# Patient Record
Sex: FEMALE | Race: WHITE | NOT HISPANIC OR LATINO | Employment: UNEMPLOYED | URBAN - METROPOLITAN AREA
[De-identification: names, ages, dates, MRNs, and addresses within clinical notes are randomized per-mention and may not be internally consistent; named-entity substitution may affect disease eponyms.]

---

## 2017-01-26 ENCOUNTER — OFFICE VISIT - HEALTHEAST (OUTPATIENT)
Dept: INTERNAL MEDICINE | Facility: CLINIC | Age: 62
End: 2017-01-26

## 2017-01-26 DIAGNOSIS — Z00.00 ANNUAL PHYSICAL EXAM: ICD-10-CM

## 2017-01-26 DIAGNOSIS — Z78.0 POST-MENOPAUSAL: ICD-10-CM

## 2017-01-26 LAB
CHOLEST SERPL-MCNC: 220 MG/DL
FASTING STATUS PATIENT QL REPORTED: YES
HDLC SERPL-MCNC: 97 MG/DL
LDLC SERPL CALC-MCNC: 109 MG/DL
TRIGL SERPL-MCNC: 69 MG/DL

## 2017-01-26 ASSESSMENT — MIFFLIN-ST. JEOR: SCORE: 1214.23

## 2017-02-01 LAB
HPV INTERPRETATION - HISTORICAL: NORMAL
HPV INTERPRETER - HISTORICAL: NORMAL

## 2017-02-02 ENCOUNTER — RECORDS - HEALTHEAST (OUTPATIENT)
Dept: ADMINISTRATIVE | Facility: OTHER | Age: 62
End: 2017-02-02

## 2017-02-02 ENCOUNTER — RECORDS - HEALTHEAST (OUTPATIENT)
Dept: BONE DENSITY | Facility: CLINIC | Age: 62
End: 2017-02-02

## 2017-02-02 ENCOUNTER — COMMUNICATION - HEALTHEAST (OUTPATIENT)
Dept: INTERNAL MEDICINE | Facility: CLINIC | Age: 62
End: 2017-02-02

## 2017-02-02 DIAGNOSIS — Z78.0 ASYMPTOMATIC MENOPAUSAL STATE: ICD-10-CM

## 2017-02-02 LAB
BKR LAB AP ABNORMAL BLEEDING: NO
BKR LAB AP BIRTH CONTROL/HORMONES: NORMAL
BKR LAB AP CERVICAL APPEARANCE: NORMAL
BKR LAB AP GYN ADEQUACY: NORMAL
BKR LAB AP GYN INTERPRETATION: NORMAL
BKR LAB AP HPV REFLEX: NORMAL
BKR LAB AP LMP: NORMAL
BKR LAB AP PATIENT STATUS: NORMAL
BKR LAB AP PREVIOUS ABNORMAL: NORMAL
BKR LAB AP PREVIOUS NORMAL: 2013
HIGH RISK?: NO
PATH REPORT.COMMENTS IMP SPEC: NORMAL
RESULT FLAG (HE HISTORICAL CONVERSION): NORMAL

## 2017-02-09 ENCOUNTER — COMMUNICATION - HEALTHEAST (OUTPATIENT)
Dept: INTERNAL MEDICINE | Facility: CLINIC | Age: 62
End: 2017-02-09

## 2018-01-31 ENCOUNTER — HOSPITAL ENCOUNTER (OUTPATIENT)
Dept: MAMMOGRAPHY | Facility: HOSPITAL | Age: 63
Discharge: HOME | End: 2018-01-31

## 2018-01-31 ENCOUNTER — OFFICE VISIT - HEALTHEAST (OUTPATIENT)
Dept: INTERNAL MEDICINE | Facility: CLINIC | Age: 63
End: 2018-01-31

## 2018-01-31 DIAGNOSIS — Z12.31 VISIT FOR SCREENING MAMMOGRAM: ICD-10-CM

## 2018-01-31 DIAGNOSIS — M81.0 OSTEOPOROSIS: ICD-10-CM

## 2018-01-31 DIAGNOSIS — Z00.00 ANNUAL PHYSICAL EXAM: ICD-10-CM

## 2018-01-31 DIAGNOSIS — E78.5 HLD (HYPERLIPIDEMIA): ICD-10-CM

## 2018-01-31 ASSESSMENT — MIFFLIN-ST. JEOR: SCORE: 1178.85

## 2018-02-02 ENCOUNTER — HOSPITAL ENCOUNTER (OUTPATIENT)
Dept: MAMMOGRAPHY | Facility: HOSPITAL | Age: 63
Discharge: HOME | End: 2018-02-02

## 2018-02-02 DIAGNOSIS — N64.89 BREAST ASYMMETRY: ICD-10-CM

## 2018-05-08 ENCOUNTER — COMMUNICATION - HEALTHEAST (OUTPATIENT)
Dept: INTERNAL MEDICINE | Facility: CLINIC | Age: 63
End: 2018-05-08

## 2019-02-19 ENCOUNTER — OFFICE VISIT - HEALTHEAST (OUTPATIENT)
Dept: INTERNAL MEDICINE | Facility: CLINIC | Age: 64
End: 2019-02-19

## 2019-02-19 DIAGNOSIS — E78.2 MIXED HYPERLIPIDEMIA: ICD-10-CM

## 2019-02-19 DIAGNOSIS — M81.0 AGE-RELATED OSTEOPOROSIS WITHOUT CURRENT PATHOLOGICAL FRACTURE: ICD-10-CM

## 2019-02-19 DIAGNOSIS — Z12.11 SCREEN FOR COLON CANCER: ICD-10-CM

## 2019-02-19 DIAGNOSIS — Z78.0 POST-MENOPAUSAL: ICD-10-CM

## 2019-02-19 DIAGNOSIS — Z00.00 WELCOME TO MEDICARE PREVENTIVE VISIT: ICD-10-CM

## 2019-02-19 LAB
ANION GAP SERPL CALCULATED.3IONS-SCNC: 6 MMOL/L (ref 5–18)
ATRIAL RATE - MUSE: 69 BPM
BUN SERPL-MCNC: 14 MG/DL (ref 8–22)
CALCIUM SERPL-MCNC: 9.7 MG/DL (ref 8.5–10.5)
CHLORIDE BLD-SCNC: 104 MMOL/L (ref 98–107)
CHOLEST SERPL-MCNC: 217 MG/DL
CO2 SERPL-SCNC: 29 MMOL/L (ref 22–31)
CREAT SERPL-MCNC: 0.67 MG/DL (ref 0.6–1.1)
DIASTOLIC BLOOD PRESSURE - MUSE: NORMAL MMHG
FASTING STATUS PATIENT QL REPORTED: YES
GFR SERPL CREATININE-BSD FRML MDRD: >60 ML/MIN/1.73M2
GLUCOSE BLD-MCNC: 85 MG/DL (ref 70–125)
HDLC SERPL-MCNC: 99 MG/DL
INTERPRETATION ECG - MUSE: NORMAL
LDLC SERPL CALC-MCNC: 107 MG/DL
P AXIS - MUSE: 58 DEGREES
POTASSIUM BLD-SCNC: 4.1 MMOL/L (ref 3.5–5)
PR INTERVAL - MUSE: 130 MS
QRS DURATION - MUSE: 82 MS
QT - MUSE: 402 MS
QTC - MUSE: 430 MS
R AXIS - MUSE: 52 DEGREES
SODIUM SERPL-SCNC: 139 MMOL/L (ref 136–145)
SYSTOLIC BLOOD PRESSURE - MUSE: NORMAL MMHG
T AXIS - MUSE: 31 DEGREES
TRIGL SERPL-MCNC: 54 MG/DL
VENTRICULAR RATE- MUSE: 69 BPM

## 2019-02-19 ASSESSMENT — MIFFLIN-ST. JEOR: SCORE: 1224.21

## 2019-02-20 ENCOUNTER — COMMUNICATION - HEALTHEAST (OUTPATIENT)
Dept: INTERNAL MEDICINE | Facility: CLINIC | Age: 64
End: 2019-02-20

## 2019-03-07 ENCOUNTER — COMMUNICATION - HEALTHEAST (OUTPATIENT)
Dept: INTERNAL MEDICINE | Facility: CLINIC | Age: 64
End: 2019-03-07

## 2019-03-11 ASSESSMENT — MIFFLIN-ST. JEOR
SCORE: 1224.21
SCORE: 1224.21

## 2019-03-12 ENCOUNTER — ANESTHESIA - HEALTHEAST (OUTPATIENT)
Dept: SURGERY | Facility: AMBULATORY SURGERY CENTER | Age: 64
End: 2019-03-12

## 2019-03-13 ENCOUNTER — HOSPITAL ENCOUNTER (OUTPATIENT)
Dept: SURGERY | Facility: AMBULATORY SURGERY CENTER | Age: 64
Discharge: HOME | End: 2019-03-13
Attending: SURGERY | Admitting: SURGERY

## 2019-03-13 ENCOUNTER — TRANSFERRED RECORDS (OUTPATIENT)
Dept: HEALTH INFORMATION MANAGEMENT | Facility: CLINIC | Age: 64
End: 2019-03-13

## 2019-03-13 ENCOUNTER — SURGERY - HEALTHEAST (OUTPATIENT)
Dept: SURGERY | Facility: AMBULATORY SURGERY CENTER | Age: 64
End: 2019-03-13

## 2019-03-13 ASSESSMENT — MIFFLIN-ST. JEOR
SCORE: 1224.21
SCORE: 1224.21

## 2020-01-03 ENCOUNTER — COMMUNICATION - HEALTHEAST (OUTPATIENT)
Dept: INTERNAL MEDICINE | Facility: CLINIC | Age: 65
End: 2020-01-03

## 2020-01-03 DIAGNOSIS — R92.30 DENSE BREAST TISSUE: ICD-10-CM

## 2020-01-03 DIAGNOSIS — Z12.31 ENCOUNTER FOR SCREENING MAMMOGRAM FOR BREAST CANCER: ICD-10-CM

## 2020-02-12 ENCOUNTER — COMMUNICATION - HEALTHEAST (OUTPATIENT)
Dept: INTERNAL MEDICINE | Facility: CLINIC | Age: 65
End: 2020-02-12

## 2020-02-13 ENCOUNTER — AMBULATORY - HEALTHEAST (OUTPATIENT)
Dept: INTERNAL MEDICINE | Facility: CLINIC | Age: 65
End: 2020-02-13

## 2020-02-13 DIAGNOSIS — Z12.31 VISIT FOR SCREENING MAMMOGRAM: ICD-10-CM

## 2020-02-19 ENCOUNTER — HOSPITAL ENCOUNTER (OUTPATIENT)
Dept: MAMMOGRAPHY | Facility: CLINIC | Age: 65
Discharge: HOME | End: 2020-02-19

## 2020-02-19 DIAGNOSIS — Z12.31 VISIT FOR SCREENING MAMMOGRAM: ICD-10-CM

## 2020-03-11 ENCOUNTER — OFFICE VISIT - HEALTHEAST (OUTPATIENT)
Dept: INTERNAL MEDICINE | Facility: CLINIC | Age: 65
End: 2020-03-11

## 2020-03-11 DIAGNOSIS — E78.2 MIXED HYPERLIPIDEMIA: ICD-10-CM

## 2020-03-11 DIAGNOSIS — M81.0 AGE-RELATED OSTEOPOROSIS WITHOUT CURRENT PATHOLOGICAL FRACTURE: ICD-10-CM

## 2020-03-11 DIAGNOSIS — Z00.00 ENCOUNTER FOR MEDICARE ANNUAL WELLNESS EXAM: ICD-10-CM

## 2020-03-11 ASSESSMENT — MIFFLIN-ST. JEOR: SCORE: 1192.46

## 2021-01-05 ENCOUNTER — COMMUNICATION - HEALTHEAST (OUTPATIENT)
Dept: ADMINISTRATIVE | Facility: CLINIC | Age: 66
End: 2021-01-05

## 2021-01-05 DIAGNOSIS — Z78.0 POST-MENOPAUSAL: ICD-10-CM

## 2021-03-16 ENCOUNTER — OFFICE VISIT - HEALTHEAST (OUTPATIENT)
Dept: INTERNAL MEDICINE | Facility: CLINIC | Age: 66
End: 2021-03-16

## 2021-03-16 DIAGNOSIS — R25.1 TREMOR: ICD-10-CM

## 2021-03-16 DIAGNOSIS — E55.9 VITAMIN D DEFICIENCY: ICD-10-CM

## 2021-03-16 DIAGNOSIS — Z13.1 SCREENING FOR DIABETES MELLITUS: ICD-10-CM

## 2021-03-16 DIAGNOSIS — E78.2 MIXED HYPERLIPIDEMIA: ICD-10-CM

## 2021-03-16 DIAGNOSIS — Z00.00 ENCOUNTER FOR ANNUAL WELLNESS EXAM IN MEDICARE PATIENT: ICD-10-CM

## 2021-03-16 DIAGNOSIS — Z11.59 ENCOUNTER FOR HEPATITIS C SCREENING TEST FOR LOW RISK PATIENT: ICD-10-CM

## 2021-03-16 LAB
CHOLEST SERPL-MCNC: 221 MG/DL
FASTING STATUS PATIENT QL REPORTED: YES
FASTING STATUS PATIENT QL REPORTED: YES
GLUCOSE BLD-MCNC: 93 MG/DL (ref 70–125)
HCV AB SERPL QL IA: NEGATIVE
HDLC SERPL-MCNC: 102 MG/DL
LDLC SERPL CALC-MCNC: 106 MG/DL
TRIGL SERPL-MCNC: 65 MG/DL

## 2021-03-16 ASSESSMENT — MIFFLIN-ST. JEOR: SCORE: 1246.89

## 2021-03-17 ENCOUNTER — AMBULATORY - HEALTHEAST (OUTPATIENT)
Dept: INTERNAL MEDICINE | Facility: CLINIC | Age: 66
End: 2021-03-17

## 2021-03-17 DIAGNOSIS — E55.9 VITAMIN D DEFICIENCY: ICD-10-CM

## 2021-03-17 LAB
25(OH)D3 SERPL-MCNC: 29.5 NG/ML (ref 30–80)
25(OH)D3 SERPL-MCNC: 29.5 NG/ML (ref 30–80)

## 2021-03-23 ENCOUNTER — AMBULATORY - HEALTHEAST (OUTPATIENT)
Dept: INTERNAL MEDICINE | Facility: CLINIC | Age: 66
End: 2021-03-23

## 2021-03-23 ENCOUNTER — COMMUNICATION - HEALTHEAST (OUTPATIENT)
Dept: INTERNAL MEDICINE | Facility: CLINIC | Age: 66
End: 2021-03-23

## 2021-03-23 DIAGNOSIS — E55.9 VITAMIN D DEFICIENCY: ICD-10-CM

## 2021-05-26 ENCOUNTER — RECORDS - HEALTHEAST (OUTPATIENT)
Dept: ADMINISTRATIVE | Facility: CLINIC | Age: 66
End: 2021-05-26

## 2021-05-27 ENCOUNTER — RECORDS - HEALTHEAST (OUTPATIENT)
Dept: ADMINISTRATIVE | Facility: CLINIC | Age: 66
End: 2021-05-27

## 2021-05-28 ENCOUNTER — RECORDS - HEALTHEAST (OUTPATIENT)
Dept: ADMINISTRATIVE | Facility: CLINIC | Age: 66
End: 2021-05-28

## 2021-05-30 VITALS — HEIGHT: 64 IN | BODY MASS INDEX: 26.43 KG/M2 | WEIGHT: 154.8 LBS

## 2021-05-31 VITALS — HEIGHT: 64 IN | WEIGHT: 147 LBS | BODY MASS INDEX: 25.1 KG/M2

## 2021-06-02 VITALS — WEIGHT: 157 LBS | BODY MASS INDEX: 26.8 KG/M2 | HEIGHT: 64 IN

## 2021-06-02 VITALS
HEIGHT: 64 IN | HEIGHT: 64 IN | BODY MASS INDEX: 26.8 KG/M2 | BODY MASS INDEX: 26.8 KG/M2 | WEIGHT: 157 LBS | WEIGHT: 157 LBS

## 2021-06-04 VITALS
SYSTOLIC BLOOD PRESSURE: 130 MMHG | HEIGHT: 64 IN | WEIGHT: 150 LBS | DIASTOLIC BLOOD PRESSURE: 74 MMHG | BODY MASS INDEX: 25.61 KG/M2 | HEART RATE: 80 BPM

## 2021-06-04 NOTE — TELEPHONE ENCOUNTER
Who is calling:  Patient    Reason for Call:  Caller states patient is scheduled for Mammogram appointment in February caller  Requesting provider to send 3 D mammogram  order to patient insurance , because she had problem last time when she went for mammogram . Please reach out patient   for any questions.  Date of last appointment with primary care: 02/19/19  Okay to leave a detailed message: No

## 2021-06-05 VITALS
WEIGHT: 162 LBS | HEART RATE: 70 BPM | HEIGHT: 64 IN | DIASTOLIC BLOOD PRESSURE: 62 MMHG | SYSTOLIC BLOOD PRESSURE: 138 MMHG | BODY MASS INDEX: 27.66 KG/M2

## 2021-06-06 NOTE — TELEPHONE ENCOUNTER
"Spoke with patients  regarding appts that were scheduled today. He mentioned that Kalpana is having a mammogram done in the next week, they are requesting if they could have a letter to send to their insurance that she could have the 3D mammogram instead of the x-ray as he stated \"everytime she has one, they find something and have her do the 3D one anyway\"        Please advise.  "

## 2021-06-06 NOTE — TELEPHONE ENCOUNTER
Let patient know that I ordered the mammogram as a 3D mammogram. I do not recommend a letter to the insurance, typically the order with a notation that she has dense breast tissue is sufficient. Note that she could have an additional change for this type of 3D mammogram if insurance does not fully cover the cost, they could contact the insurance to find out if it is covered fully or not.

## 2021-06-06 NOTE — PROGRESS NOTES
Assessment and Plan:     1. Encounter for Medicare annual wellness exam    2. Mixed hyperlipidemia  Lipid panel last year not in a range where medication recommended. Continue regular exercise and healthy diet     3. Age-related osteoporosis without current pathological fracture  On drug holiday currently. Repeat DXA in 2021. Consider Reclast d/t intolerance of Fosamax         The patient's current medical problems were reviewed.    The following high BMI interventions were performed this visit: encouragement to exercise     The following health maintenance schedule was reviewed with the patient and provided in printed form in the after visit summary:   Health Maintenance   Topic Date Due     HEPATITIS C SCREENING  1955     MEDICARE ANNUAL WELLNESS VISIT  02/19/2020     INFLUENZA VACCINE RULE BASED (1) 06/30/2020 (Originally 8/1/2019)     DXA SCAN  03/06/2021     FALL RISK ASSESSMENT  03/11/2021     TD 18+ HE  12/29/2021     MAMMOGRAM  02/19/2022     ADVANCE CARE PLANNING  01/31/2023     LIPID  02/19/2024     COLORECTAL CANCER SCREENING  03/13/2029     PNEUMOCOCCAL IMMUNIZATION 65+ LOW/MEDIUM RISK  Completed     ZOSTER VACCINES  Completed        Subjective:   Chief Complaint: Charissa Lou is an 65 y.o. female here for an Annual Wellness visit.     HPI:  Charissa Lou is an 65 y.o. female here for an Annual Wellness visit.     She is on a drug holiday from treatment of osteoporosis.     Review of Systems:  Please see above.  The rest of the review of systems are negative for all systems.    Patient Care Team:  Misti Tidwell FNP as PCP - General (Nurse Practitioner)  Misti Tidwell FNP as Assigned PCP     Patient Active Problem List   Diagnosis     Osteoporosis     Osteoarthritis     HLD (hyperlipidemia), 10-year ASCVD risk 3.2% 2/2017     Encounter for screening colonoscopy     Past Medical History:   Diagnosis Date     Arthritis       Past Surgical History:   Procedure Laterality Date      COLONOSCOPY N/A 3/13/2019    Procedure: COLONOSCOPY;  Surgeon: Mu Paiz MD;  Location: [facility] OR;  Service: General      Family History   Problem Relation Age of Onset     Osteoporosis Mother      Diabetes Father      Breast cancer Neg Hx       Social History     Socioeconomic History     Marital status:      Spouse name: Not on file     Number of children: 1     Years of education: Not on file     Highest education level: Not on file   Occupational History     Not on file   Social Needs     Financial resource strain: Not on file     Food insecurity     Worry: Not on file     Inability: Not on file     Transportation needs     Medical: Not on file     Non-medical: Not on file   Tobacco Use     Smoking status: Never Smoker     Smokeless tobacco: Never Used   Substance and Sexual Activity     Alcohol use: Yes     Alcohol/week: 0.0 standard drinks     Drug use: No     Sexual activity: Not on file   Lifestyle     Physical activity     Days per week: Not on file     Minutes per session: Not on file     Stress: Not on file   Relationships     Social connections     Talks on phone: Not on file     Gets together: Not on file     Attends Taoism service: Not on file     Active member of club or organization: Not on file     Attends meetings of clubs or organizations: Not on file     Relationship status: Not on file     Intimate partner violence     Fear of current or ex partner: Not on file     Emotionally abused: Not on file     Physically abused: Not on file     Forced sexual activity: Not on file   Other Topics Concern     Not on file   Social History Narrative     Not on file      Current Outpatient Medications   Medication Sig Dispense Refill     CALCIUM CARBONATE/VITAMIN D3 (CALCIUM 500 + D ORAL) Take 1 tablet by mouth 2 times a day at 6:00 am and 4:00 pm.       cholecalciferol, vitamin D3, 1,000 unit tablet Take 1,000 Units by mouth daily.       glucosamine-chondroitin 500-400 mg cap Take 2  "capsules by mouth daily.       PSYLLIUM SEED, WITH SUGAR, (METAMUCIL ORAL) Take by mouth.       No current facility-administered medications for this visit.       Objective:   Vital Signs:   Visit Vitals  /74   Pulse 80   Ht 5' 3.5\" (1.613 m)   Wt 150 lb (68 kg)   BMI 26.15 kg/m         VisionScreening:  No exam data present     PHYSICAL EXAM  Gen: Well developed, well nourished, no acute distress.  HEENT: normocephalic/atraumatic, PERRL/EOMI, TMs: Gray, normal light reflex, no nasal discharge.  Oral mucosa: no erythema/exudate  Neck: No LAD/masses/thyromegaly/bruits  Lungs: clear bilaterally  Heart: regular rate and rhythm, no murmurs/gallops/rubs  Abdomen: Normal bowel sounds, soft, non-tender, non-distended  Lymphatics: no supraclavicular/cervical LAD. No edema.  Neuro: A&O x 3.  Psych: Behavior appropriate, engaging.  Thought processes congruent, non-tangential.  Musculoskeletal: no gross deformities.  Skin: no rashes or lesions.      Assessment Results 3/11/2020   Activities of Daily Living No help needed   Instrumental Activities of Daily Living No help needed   Mini Cog Total Score 5   Some recent data might be hidden     A Mini-Cog score of 0-2 suggests the possibility of dementia, score of 3-5 suggests no dementia    Identified Health Risks:     Information regarding advance directives (living [place]), including where she can download the appropriate form, was provided to the patient via the AVS.       "

## 2021-06-06 NOTE — TELEPHONE ENCOUNTER
Left generic message for patient to call back. Please relay information when she calls back, thanks.

## 2021-06-08 NOTE — PROGRESS NOTES
Assessment/Plan:     1. Annual physical exam  - Lipid Profile  - Glucose; Future  - Gynecologic Cytology (PAP Smear)  - Reviewed the importance of monitoring for changes in breast appearance such as nipple inversion, changes in breast tissue texture, non-healing wounds, or nipple discharge   - The following high BMI interventions were performed this visit: encouragement to exercise   - Colonoscopy due for repeat, she declines     2. Post-menopausal  - DXA Bone Density Scan; Future. Previous rx for fosamax, stopped 2 years ago after 5 years of treatment           Subjective:     Charissa Lou is a 62 y.o. female who presents for an annual exam. She does not have any new concerns today    Has a past medical history of osteoporosis and was on Fosamax for approximately 5 years.  The treatment was stopped 2 years ago and she has not had a follow-up bone density since .    The patient reports that there is not domestic violence in her life.     Healthy Habits:   Regular Exercise: Yes, exercise bike   Sunscreen Use: Yes  Healthy Diet: Yes  Dental Visits Regularly: Yes  Sexually active: No  Mammogram: 2016  Colonoscopy: Yes, . Due for follow up colonoscopy but she declines   Prevention of Osteoporosis: Yes, vitamin D and calcium supplements  Last Dexa: Yes,     Immunization History   Administered Date(s) Administered     DT (pediatric) 2002     Tdap 2008, 2011     ZOSTER 2015     Immunization status: UTD and documented     Gynecologic History  No LMP recorded. Patient is postmenopausal.  Contraception: post menopausal status  Last Pap: . Results were: normal  HPV testing: not done   Last mammogram: . Results were: normal     OB History    Para Term  AB SAB TAB Ectopic Multiple Living   1 1 1             # Outcome Date GA Lbr Tariq/2nd Weight Sex Delivery Anes PTL Lv   1 Term                   Current Outpatient Prescriptions   Medication Sig Dispense Refill      CALCIUM CARBONATE/VITAMIN D3 (CALCIUM 500 + D ORAL) Take 1 tablet by mouth 2 times a day at 6:00 am and 4:00 pm.       cholecalciferol, vitamin D3, 1,000 unit tablet Take 1,000 Units by mouth daily.       glucosamine-chondroitin 500-400 mg cap Take 2 capsules by mouth daily.       PSYLLIUM SEED, WITH SUGAR, (METAMUCIL ORAL) Take by mouth.       No current facility-administered medications for this visit.      History reviewed. No pertinent past medical history.  History reviewed. No pertinent past surgical history.  Review of patient's allergies indicates no known allergies.  Family History   Problem Relation Age of Onset     Osteoporosis Mother      No Medical Problems Father      No Medical Problems Sister      No Medical Problems Daughter      No Medical Problems Maternal Grandmother      No Medical Problems Maternal Grandfather      No Medical Problems Paternal Grandmother      No Medical Problems Paternal Grandfather      No Medical Problems Maternal Aunt      No Medical Problems Paternal Aunt      BRCA 1/2 Neg Hx      Breast cancer Neg Hx      Cancer Neg Hx      Colon cancer Neg Hx      Endometrial cancer Neg Hx      Ovarian cancer Neg Hx      Social History     Social History     Marital status:      Spouse name: N/A     Number of children: N/A     Years of education: N/A     Occupational History     Not on file.     Social History Main Topics     Smoking status: Never Smoker     Smokeless tobacco: Never Used     Alcohol use 0.0 oz/week     0 - 2 Cans of beer per week     Drug use: Not on file     Sexual activity: Not on file     Other Topics Concern     Not on file     Social History Narrative       Review of Systems  General:  Negative except as noted above  Eyes: Negative except as noted above  Ears/Nose/Throat: Negative except as noted above  Cardiovascular: Negative except as noted above  Respiratory:  Negative except as noted above  Gastrointestinal:  Negative except as noted  "above  Musculoskeletal:  Negative except as noted above  Skin: Negative except as noted above  Neurologic: Negative except as noted above  Psychiatric: Negative except as noted above  Endocrine: Negative except as noted above  Heme/Lymphatic: Negative except as noted above   Allergic/Immunologic: Negative except as noted above      Objective:      Vitals:    01/26/17 1013   BP: 120/82   Patient Site: Right Arm   Patient Position: Sitting   Cuff Size: Adult Regular   Pulse: 76   Weight: 154 lb 12.8 oz (70.2 kg)   Height: 5' 3.5\" (1.613 m)     Wt Readings from Last 3 Encounters:   01/26/17 154 lb 12.8 oz (70.2 kg)   01/21/16 161 lb (73 kg)   01/28/15 146 lb (66.2 kg)     Body mass index is 26.99 kg/(m^2).    Physical Exam:  General Appearance: Alert, cooperative, no distress.  Head: Normocephalic, without obvious abnormality, atraumatic  Eyes: PERRL, conjunctiva/corneas clear, EOM's intact  Ears: Normal TM's and external ear canals, both ears  Nose: Nares normal, septum midline,mucosa normal, no drainage  Throat: Lips, mucosa, and tongue normal  Neck: Supple, symmetrical, trachea midline, no adenopathy;  thyroid: not enlarged, symmetric, no tenderness/mass/nodules  Back: Symmetric, no curvature, ROM normal, no CVA tenderness  Lungs: Clear to auscultation bilaterally, respirations unlabored  Breasts: No breast masses, tenderness, asymmetry, or nipple discharge.  Heart: Regular rate and rhythm, S1 and S2 normal, no murmur, rub, or gallop  Abdomen: Soft, non-tender, bowel sounds active all four quadrants,  no masses, no organomegaly  Pelvic: Genital: EXTERNAL GENITALIA: Normal appearing vulva without masses, tenderness or lesions. PERINEUM: normal and intact. URETHRAL MEATUS: normal VAGINA:  Atrophied. vagina with normal color and without discharge or lesions. CERVIX: normal appearing cervix without discharge or lesions. Non-friable. No CMT. UTERUS: uterus is normal size, shape, consistency, and non-tender. ADNEXA: no " tenderness or fullness.   Extremities: Extremities normal, atraumatic, no cyanosis or edema  Skin: Skin color, texture, turgor normal, no rashes or lesions  Lymph nodes: Cervical, supraclavicular, and axillary nodes normal  Neurologic: Normal  Psych: Normal affect.  Does not appear anxious or depressed.

## 2021-06-14 NOTE — TELEPHONE ENCOUNTER
Reason for Call: Request for an order or referral:    Order or referral being requested: Bone Density Test    Date needed: at your convenience    Has the patient been seen by the PCP for this problem? YES    Additional comments: Patient  calling this morning, requesting orders for Bone Density testing for his wife.  They both have appts on 3/16/21 with Misti Tidwell.  They would like to do the Bone Density the same day as well.    Phone number Patient can be reached at:  [phone]    Best Time:  Any time    Can we leave a detailed message on this number?  Yes    Call taken on 1/5/2021 at 11:53 AM by Fabián Cordon

## 2021-06-15 PROBLEM — E78.5 HLD (HYPERLIPIDEMIA): Status: ACTIVE | Noted: 2017-02-02

## 2021-06-15 NOTE — PROGRESS NOTES
Assessment and Plan:     Problem List Items Addressed This Visit     HLD (hyperlipidemia), 10-year ASCVD risk 3.2% 2/2017    Relevant Orders    Lipid [facility] FASTING (Completed)    Tremor     Tremor is symmetrical, in hands bilaterally. Postural action tremor.            Other Visit Diagnoses     Encounter for annual wellness exam in Medicare patient    -  Primary    Vitamin D deficiency        Relevant Orders    Vitamin D, Total (25-Hydroxy) (Completed)    Screening for diabetes mellitus        Relevant Orders    Glucose (Completed)    Encounter for hepatitis C screening test for low risk patient        Relevant Orders    Hepatitis C Antibody (Anti-HCV) (Completed)            The patient's current medical problems were reviewed.    I have had an Advance Directives discussion with the patient.  The following high BMI interventions were performed this visit: encouragement to exercise  The following health maintenance schedule was reviewed with the patient and provided in printed form in the after visit summary:   Health Maintenance Due   Topic Date Due     COVID-19 Vaccine (1) Never done     FALL RISK ASSESSMENT  03/11/2021     TD 18+ HE  12/29/2021        Subjective:   Chief Complaint: Charissa Lou is an 66 y.o. female here for an Annual Wellness visit.     HPI:  Charissa Lou is an 66 y.o. female here for an Annual Wellness visit.     Review of Systems:  Please see above.  The rest of the review of systems are negative for all systems.    Patient Care Team:  Misti Tidwell FNP as PCP - General (Nurse Practitioner)  Misti Tidwell FNP as Assigned PCP     Patient Active Problem List   Diagnosis     Osteoporosis     Osteoarthritis     HLD (hyperlipidemia), 10-year ASCVD risk 3.2% 2/2017     Encounter for screening colonoscopy     Tremor     Past Medical History:   Diagnosis Date     Arthritis       Past Surgical History:   Procedure Laterality Date     COLONOSCOPY N/A 3/13/2019    Procedure:  COLONOSCOPY;  Surgeon: Mu Paiz MD;  Location: [facility] OR;  Service: General      Family History   Problem Relation Age of Onset     Osteoporosis Mother      Diabetes Father      Breast cancer Neg Hx       Social History     Socioeconomic History     Marital status:      Spouse name: Not on file     Number of children: 1     Years of education: Not on file     Highest education level: Not on file   Occupational History     Not on file   Social Needs     Financial resource strain: Not on file     Food insecurity     Worry: Not on file     Inability: Not on file     Transportation needs     Medical: Not on file     Non-medical: Not on file   Tobacco Use     Smoking status: Never Smoker     Smokeless tobacco: Never Used   Substance and Sexual Activity     Alcohol use: Yes     Alcohol/week: 0.0 standard drinks     Drug use: No     Sexual activity: Not on file   Lifestyle     Physical activity     Days per week: Not on file     Minutes per session: Not on file     Stress: Not on file   Relationships     Social connections     Talks on phone: Not on file     Gets together: Not on file     Attends Rastafarian service: Not on file     Active member of club or organization: Not on file     Attends meetings of clubs or organizations: Not on file     Relationship status: Not on file     Intimate partner violence     Fear of current or ex partner: Not on file     Emotionally abused: Not on file     Physically abused: Not on file     Forced sexual activity: Not on file   Other Topics Concern     Not on file   Social History Narrative     Not on file      Current Outpatient Medications   Medication Sig Dispense Refill     CALCIUM CARBONATE/VITAMIN D3 (CALCIUM 500 + D ORAL) Take 1 tablet by mouth 2 times a day at 6:00 am and 4:00 pm.       glucosamine-chondroitin 500-400 mg cap Take 2 capsules by mouth daily.       PSYLLIUM SEED, WITH SUGAR, (METAMUCIL ORAL) Take by mouth.       cholecalciferol, vitamin D3,  "1,000 unit (25 mcg) tablet Take 2 tablets (2,000 Units total) by mouth daily.  0     No current facility-administered medications for this visit.       Objective:   Vital Signs:   Visit Vitals  /62   Pulse 70   Ht 5' 3.5\" (1.613 m)   Wt 162 lb (73.5 kg)   BMI 28.25 kg/m           VisionScreening:  No exam data present     PHYSICAL EXAM  Gen: Well developed, well nourished, no acute distress.  HEENT: PERRL  Neck: No LAD/masses/thyromegaly/bruits  Lungs: clear bilaterally  Heart: regular rate and rhythm, no murmurs/gallops/rubs  Abdomen: Normal bowel sounds, soft, non-tender, non-distended  Lymphatics: no supraclavicular/cervical LAD. No edema.  Neuro: A&O x 3. Fine tremor noted in outstretched hands against gravity only. No resting tremor   Psych: Behavior appropriate, engaging.  Thought processes congruent, non-tangential.  Musculoskeletal: no gross deformities.  Skin: no rashes or lesions.        Assessment Results 3/16/2021   Activities of Daily Living No help needed   Instrumental Activities of Daily Living No help needed   Mini Cog Total Score 4   Some recent data might be hidden     A Mini-Cog score of 0-2 suggests the possibility of dementia, score of 3-5 suggests no dementia    Identified Health Risks:     Information regarding advance directives (living [place]), including where she can download the appropriate form, was provided to the patient via the AVS.       "

## 2021-06-15 NOTE — PROGRESS NOTES
Assessment/Plan:     1. Annual physical exam  2. Osteoporosis  3. HLD (hyperlipidemia)  - Reviewed the importance of monitoring for changes in breast appearance such as nipple inversion, changes in breast tissue texture, non-healing wounds, or nipple discharge   - The following high BMI interventions were performed this visit: encouragement to exercise   -Shared decision making was used in review of her bone density scan from last year and treatment of osteoporosis.  At this time, she elects to continue with calcium and vitamin D supplement along with weight bearing exercise.  Will retest her bone density next year and she may consider resumption of medication.  Of note she did have GI side effects with Fosamax and may wish to consider an alternative medication.  -Suspect that diarrhea is due to gastroenteritis.  She is advised to increase fluid intake and may replace fluids with Pedialyte or similar product.  If symptoms persist beyond 7-10 days or seem to be worsening she is advised to follow-up  -Colonoscopy report was requested.  She had this last in 2014 and was told to follow-up in 5 years.  - I have had an Advance Directives discussion with the patient. 5 wishes packet given to patient.         Subjective:     Charissa Lou is a 63 y.o. female who presents for an annual exam.     She has had some loose stools, two per day since Saturday. No fevers/chills/abdominal pain.     We reviewed the results of her lab tests from last year.  She had normal glucose reading and cholesterol was only mildly elevated.  She is considered low ASCVD risk and no medication was advised.  No repeat labs are due this year.    We reviewed her history of osteoporosis.  She was treated with Fosamax for 5 years and is now on a drug holiday.  She did have diarrhea associated with this medication and is not excited to resume this medication.  Her bone density last year did show stability compared to the prior reading.    The patient  reports that there is not domestic violence in her life.     Healthy Habits:   Regular Exercise: Yes  Sunscreen Use: Yes  Healthy Diet: Yes  Dental Visits Regularly: Yes      Immunization History   Administered Date(s) Administered     DT (pediatric) 2002     Tdap 2008, 2011     ZOSTER 2015     Immunization status: declines influenza vaccine     Gynecologic History  No LMP recorded. Patient is postmenopausal.    Last Pap: 2017. Results were: normal HPV testing: negative      OB History    Para Term  AB Living   1 1 1      SAB TAB Ectopic Multiple Live Births             # Outcome Date GA Lbr Tariq/2nd Weight Sex Delivery Anes PTL Lv   1 Term                   Current Outpatient Prescriptions   Medication Sig Dispense Refill     CALCIUM CARBONATE/VITAMIN D3 (CALCIUM 500 + D ORAL) Take 1 tablet by mouth 2 times a day at 6:00 am and 4:00 pm.       cholecalciferol, vitamin D3, 1,000 unit tablet Take 1,000 Units by mouth daily.       glucosamine-chondroitin 500-400 mg cap Take 2 capsules by mouth daily.       PSYLLIUM SEED, WITH SUGAR, (METAMUCIL ORAL) Take by mouth.       No current facility-administered medications for this visit.      History reviewed. No pertinent past medical history.  History reviewed. No pertinent surgical history.  Review of patient's allergies indicates no known allergies.  Family History   Problem Relation Age of Onset     Osteoporosis Mother      Diabetes Father      Breast cancer Neg Hx      Social History     Social History     Marital status:      Spouse name: N/A     Number of children: 1     Years of education: N/A     Occupational History     Not on file.     Social History Main Topics     Smoking status: Never Smoker     Smokeless tobacco: Never Used     Alcohol use 0.0 oz/week     0 - 2 Cans of beer per week     Drug use: Not on file     Sexual activity: Not on file     Other Topics Concern     Not on file     Social History Narrative  "      Review of Systems  General:  Negative except as noted above  Eyes: Negative except as noted above  Ears/Nose/Throat: Negative except as noted above  Cardiovascular: Negative except as noted above  Respiratory:  Negative except as noted above  Gastrointestinal:  Negative except as noted above  Musculoskeletal:  Negative except as noted above  Skin: Negative except as noted above  Neurologic: Negative except as noted above  Psychiatric: Negative except as noted above  Endocrine: Negative except as noted above  Heme/Lymphatic: Negative except as noted above   Allergic/Immunologic: Negative except as noted above      Objective:      Vitals:    01/31/18 1014   BP: 132/72   Weight: 147 lb (66.7 kg)   Height: 5' 3.5\" (1.613 m)     Wt Readings from Last 3 Encounters:   01/31/18 147 lb (66.7 kg)   01/26/17 154 lb 12.8 oz (70.2 kg)   01/21/16 161 lb (73 kg)     Body mass index is 25.63 kg/(m^2).     Physical Exam:  General Appearance: Alert, cooperative, no distress.  Head: Normocephalic, without obvious abnormality, atraumatic  Eyes: PERRL, conjunctiva/corneas clear, EOM's intact  Ears: Normal TM's and external ear canals, both ears  Throat: Lips, mucosa, and tongue normal  Neck: Supple, symmetrical, trachea midline, no adenopathy;  thyroid: not enlarged, symmetric, no tenderness/mass/nodules  Lungs: Clear to auscultation bilaterally, respirations unlabored  Heart: Regular rate and rhythm, S1 and S2 normal, no murmur, rub, or gallop  Abdomen: Soft, non-tender, bowel sounds active all four quadrants,  no masses, no organomegaly  Extremities: Extremities normal, atraumatic, no cyanosis or edema  Skin: Skin color, texture, turgor normal, no rashes or lesions  Lymph nodes: Cervical, supraclavicular nodes normal  Neurologic: Normal  Psych: Normal affect.  Does not appear anxious or depressed.         "

## 2021-06-16 NOTE — TELEPHONE ENCOUNTER
Telephone Encounter by Alissa Clark LPN at 2/18/2020  1:57 PM     Author: Alissa Clark LPN Service: -- Author Type: Licensed Nurse    Filed: 2/18/2020  1:59 PM Encounter Date: 2/12/2020 Status: Signed    : Alissa Clark LPN (Licensed Nurse)       Patient Returning Call  Reason for call:  Patient  Tino Lou  Information relayed to patient:  Misti Tidwell FNP   to Misti Tidwell Care Team Pool         3:11 PM   Note      Let patient know that I ordered the mammogram as a 3D mammogram. I do not recommend a letter to the insurance, typically the order with a notation that she has dense breast tissue is sufficient. Note that she could have an additional change for this type of 3D mammogram if insurance does not fully cover the cost, they could contact the insurance to find out if it is covered fully or not.       Patient has additional questions:  No  If YES, what are your questions/concerns:  Caller verbalized understanding above message .  Okay to leave a detailed message?: No

## 2021-06-16 NOTE — TELEPHONE ENCOUNTER
Misti Tidwell    Patient's  Tino calling.  He said that they got a call that Charissa should increase her Vitamin D to 2000 international unit(s) daily and she is already taking that.  Please advise what she should increase too.  Tino would like a call back at

## 2021-06-17 NOTE — PATIENT INSTRUCTIONS - HE
Patient Instructions by Misti Tidwell FNP at 2/19/2019 10:20 AM     Author: Misti Tidwell FNP Service: -- Author Type: Nurse Practitioner    Filed: 2/19/2019 11:22 AM Encounter Date: 2/19/2019 Status: Addendum    : Misti Tidwell FNP (Nurse Practitioner)    Related Notes: Original Note by Misti Tidwell FNP (Nurse Practitioner) filed at 2/19/2019 11:18 AM       The new shingles shot (Shingrix) is 2 separate shots  by 2-6 months.    The cost out of pocket is around $390 for the 2 shots, so you might want to see if your insurance covers it or a portion of it prior to having it done.  Often by having it done at a pharmacy rather than a clinic, it can be cheaper for you. I recommend that you check on the cost through your insurance or talk to your pharmacist about the cost of the vaccine.     It is estimated that any person's risk of shingles over their lifetime is around 10-20%.    The old shingles vaccine (Zostavax) is about 50% effective, reducing your risk of shingles to about 5-10% over your lifetime.    The new shot is 90% effective, reducing your risk of shingles to about 1-2% over your lifetime.    Because the shot is strong, it is very common to have flu like symptoms for 2-3 days after the shot.  25-50% of patients will have fever, muscle aches or headache.        Patient Education   Understanding Advance Care Planning  Advance care planning is the process of deciding ones own future medical care. It helps ensure that if you cant speak for yourself, your wishes can still be carried out. The plan is a series of legal documents that note a persons wishes. The documents vary by state. Advance care planning may be done when a person has a serious illness that is expected to get worse. It may be done before major surgery. And it can help you and your family be prepared in case of a major illness or injury. Advance care planning helps with making decisions at these  times.       A health care proxy is a person who acts as the voice of a patient when the patient cant speak for himself or herself. The name of this role varies by state. It may be called a Durable Medical Power of  or Durable Power of  for Healthcare. It may be called an agent, surrogate, or advocate. Or it may be called a representative or decision maker. It is an official duty that is identified by a legal document. The document also varies by state.    Why Is Advance Care Planning Important?  If a person communicates their healthcare wishes:    They will be given medical care that matches their values and goals.    Their family members will not be forced to make decisions in a crisis with no guidance.  Creating a Plan  Making an advance care plan is often done in 3 steps:    Thinking about ones wishes. To create an advance care plan, you should think about what kind of medical treatment you would want if you lose the ability to communicate. Are there any situations in which you would refuse or stop treatment? Are there therapies you would want or not want? And whom do you want to make decisions for you? There are many places to learn more about how to plan for your care. Ask your doctor or  for resources.    Picking a health care proxy. This means choosing a trusted person to speak for you only when you cant speak for yourself. When you cannot make medical decisions, your proxy makes sure the instructions in your advance care plan are followed. A proxy does not make decisions based on his or her own opinions. They must put aside those opinions and values if needed, and carry out your wishes.    Filling out the legal documents. There are several kinds of legal documents for advance care planning. Each one tells health care providers your wishes. The documents may vary by state. They must be signed and may need to be witnessed or notarized. You can cancel or change them whenever you  wish. Depending on your state, the documents may include a Healthcare Proxy form, Living Will, Durable Medical Power of , Advance Directive, or others.  The Familys Role  The best help a family can give is to support their loved ones wishes. Open and honest communication is vital. Family should express any concerns they have about the patients choices while the patient can still make decisions.    2434-8105 [employer]. [address] [place] [address], [place] [address]. All rights reserved. This information is not intended as a substitute for professional medical care. Always follow your healthcare professional's instructions.         Also, [employer] [place] offers a free, downloadable health care directive that allows you to share your treatment choices and personal preferences if you cannot communicate your wishes. It also allows you to appoint another person (called a health care agent) to make health care decisions if you are unable to do so. You can download an advance directive by going here: [email or web address]     Patient Education   Personalized Prevention Plan  You are due for the preventive services outlined below.  Your care team is available to assist you in scheduling these services.  If you have already completed any of these items, please share that information with your care team to update in your medical record.  Health Maintenance   Topic Date Due   ? COLONOSCOPY  10/26/2014   ? ZOSTER VACCINES (2 of 3) 06/03/2015   ? PNEUMOCOCCAL CONJUGATE VACCINE FOR ADULTS (PCV13 OR PREVNAR)  01/10/2019   ? FALL RISK ASSESSMENT  01/10/2019   ? DXA SCAN  02/02/2019   ? INFLUENZA VACCINE RULE BASED (1) 02/19/2019 (Originally 8/1/2018)   ? PNEUMOCOCCAL POLYSACCHARIDE VACCINE AGE 65 AND OVER  02/19/2020 (Originally 1/10/2019)   ? MAMMOGRAM  02/02/2020   ? TD 18+ HE  12/29/2021   ? ADVANCE DIRECTIVES DISCUSSED WITH PATIENT  01/31/2023   ? TDAP ADULT ONE TIME DOSE   Completed

## 2021-06-18 NOTE — PATIENT INSTRUCTIONS - HE
Patient Instructions by Misti Tidwell FNP at 3/11/2020 11:10 AM     Author: Misti Tidwell FNP Service: -- Author Type: Nurse Practitioner    Filed: 3/13/2020 12:38 PM Encounter Date: 3/11/2020 Status: Signed    : Misti Tidwell FNP (Nurse Practitioner)         Patient Education   Understanding Advance Care Planning  Advance care planning is the process of deciding ones own future medical care. It helps ensure that if you cant speak for yourself, your wishes can still be carried out. The plan is a series of legal documents that note a persons wishes. The documents vary by state. Advance care planning may be done when a person has a serious illness that is expected to get worse. It may be done before major surgery. And it can help you and your family be prepared in case of a major illness or injury. Advance care planning helps with making decisions at these times.       A health care proxy is a person who acts as the voice of a patient when the patient cant speak for himself or herself. The name of this role varies by state. It may be called a Durable Medical Power of  or Durable Power of  for Healthcare. It may be called an agent, surrogate, or advocate. Or it may be called a representative or decision maker. It is an official duty that is identified by a legal document. The document also varies by state.    Why Is Advance Care Planning Important?  If a person communicates their healthcare wishes:    They will be given medical care that matches their values and goals.    Their family members will not be forced to make decisions in a crisis with no guidance.  Creating a Plan  Making an advance care plan is often done in 3 steps:    Thinking about ones wishes. To create an advance care plan, you should think about what kind of medical treatment you would want if you lose the ability to communicate. Are there any situations in which you would refuse or stop treatment? Are there  therapies you would want or not want? And whom do you want to make decisions for you? There are many places to learn more about how to plan for your care. Ask your doctor or  for resources.    Picking a health care proxy. This means choosing a trusted person to speak for you only when you cant speak for yourself. When you cannot make medical decisions, your proxy makes sure the instructions in your advance care plan are followed. A proxy does not make decisions based on his or her own opinions. They must put aside those opinions and values if needed, and carry out your wishes.    Filling out the legal documents. There are several kinds of legal documents for advance care planning. Each one tells health care providers your wishes. The documents may vary by state. They must be signed and may need to be witnessed or notarized. You can cancel or change them whenever you wish. Depending on your state, the documents may include a Healthcare Proxy form, Living Will, Durable Medical Power of , Advance Directive, or others.  The Familys Role  The best help a family can give is to support their loved ones wishes. Open and honest communication is vital. Family should express any concerns they have about the patients choices while the patient can still make decisions.    8936-5611 [employer]. [address] [place] [address], [place] [address]. All rights reserved. This information is not intended as a substitute for professional medical care. Always follow your healthcare professional's instructions.         Also, [employer] [place] offers a free, downloadable health care directive that allows you to share your treatment choices and personal preferences if you cannot communicate your wishes. It also allows you to appoint another person (called a health care agent) to make health care decisions if you are unable to do so. You can download an advance directive by going here:  [email or web address]     Patient Education   Personalized Prevention Plan  You are due for the preventive services outlined below.  Your care team is available to assist you in scheduling these services.  If you have already completed any of these items, please share that information with your care team to update in your medical record.  Health Maintenance   Topic Date Due   ? HEPATITIS C SCREENING  1955   ? MEDICARE ANNUAL WELLNESS VISIT  02/19/2020   ? INFLUENZA VACCINE RULE BASED (1) 06/30/2020 (Originally 8/1/2019)   ? DXA SCAN  03/06/2021   ? FALL RISK ASSESSMENT  03/11/2021   ? TD 18+ HE  12/29/2021   ? MAMMOGRAM  02/19/2022   ? ADVANCE CARE PLANNING  01/31/2023   ? LIPID  02/19/2024   ? COLORECTAL CANCER SCREENING  03/13/2029   ? PNEUMOCOCCAL IMMUNIZATION 65+ LOW/MEDIUM RISK  Completed   ? ZOSTER VACCINES  Completed

## 2021-06-18 NOTE — PATIENT INSTRUCTIONS - HE
Patient Instructions by Misti Tidwell FNP at 3/16/2021 10:45 AM     Author: Misti Tidwell FNP Service: -- Author Type: Nurse Practitioner    Filed: 3/16/2021 10:31 AM Encounter Date: 3/16/2021 Status: Signed    : Misti Tidwell FNP (Nurse Practitioner)         Patient Education   Understanding Advance Care Planning  Advance care planning is the process of deciding ones own future medical care. It helps ensure that if you cant speak for yourself, your wishes can still be carried out. The plan is a series of legal documents that note a persons wishes. The documents vary by state. Advance care planning may be done when a person has a serious illness that is expected to get worse. It may be done before major surgery. And it can help you and your family be prepared in case of a major illness or injury. Advance care planning helps with making decisions at these times.       A health care proxy is a person who acts as the voice of a patient when the patient cant speak for himself or herself. The name of this role varies by state. It may be called a Durable Medical Power of  or Durable Power of  for Healthcare. It may be called an agent, surrogate, or advocate. Or it may be called a representative or decision maker. It is an official duty that is identified by a legal document. The document also varies by state.    Why Is Advance Care Planning Important?  If a person communicates their healthcare wishes:    They will be given medical care that matches their values and goals.    Their family members will not be forced to make decisions in a crisis with no guidance.  Creating a Plan  Making an advance care plan is often done in 3 steps:    Thinking about ones wishes. To create an advance care plan, you should think about what kind of medical treatment you would want if you lose the ability to communicate. Are there any situations in which you would refuse or stop treatment? Are there  therapies you would want or not want? And whom do you want to make decisions for you? There are many places to learn more about how to plan for your care. Ask your doctor or  for resources.    Picking a health care proxy. This means choosing a trusted person to speak for you only when you cant speak for yourself. When you cannot make medical decisions, your proxy makes sure the instructions in your advance care plan are followed. A proxy does not make decisions based on his or her own opinions. They must put aside those opinions and values if needed, and carry out your wishes.    Filling out the legal documents. There are several kinds of legal documents for advance care planning. Each one tells health care providers your wishes. The documents may vary by state. They must be signed and may need to be witnessed or notarized. You can cancel or change them whenever you wish. Depending on your state, the documents may include a Healthcare Proxy form, Living Will, Durable Medical Power of , Advance Directive, or others.  The Familys Role  The best help a family can give is to support their loved ones wishes. Open and honest communication is vital. Family should express any concerns they have about the patients choices while the patient can still make decisions.    1365-2809 [employer]. [address] [place] [address], [place] [address]. All rights reserved. This information is not intended as a substitute for professional medical care. Always follow your healthcare professional's instructions.         Also, [employer] [place] offers a free, downloadable health care directive that allows you to share your treatment choices and personal preferences if you cannot communicate your wishes. It also allows you to appoint another person (called a health care agent) to make health care decisions if you are unable to do so. You can download an advance directive by going here:  [email or web address]     Patient Education   Personalized Prevention Plan  You are due for the preventive services outlined below.  Your care team is available to assist you in scheduling these services.  If you have already completed any of these items, please share that information with your care team to update in your medical record.  Health Maintenance Due   Topic Date Due   ? HEPATITIS C SCREENING  Never done   ? COVID-19 Vaccine (1 of 2) Never done   ? INFLUENZA VACCINE RULE BASED (1) 08/01/2020   ? FALL RISK ASSESSMENT  03/11/2021   ? TD 18+ HE  12/29/2021

## 2021-06-24 NOTE — ANESTHESIA POSTPROCEDURE EVALUATION
Patient: Charissa Lou  COLONOSCOPY  Anesthesia type: MAC    Patient location: PACU  Last vitals:   Vitals:    03/13/19 1345   BP: (!) 147/116   Pulse: 88   Resp: 16   Temp:    SpO2: 97%     Post vital signs: stable  Level of consciousness: awake and responds to simple questions  Post-anesthesia pain: pain controlled  Post-anesthesia nausea and vomiting: no  Pulmonary: unassisted, return to baseline  Cardiovascular: stable and blood pressure at baseline  Hydration: adequate  Anesthetic events: no    QCDR Measures:  ASA# 11 - Carrie-op Cardiac Arrest: ASA11B - Patient did NOT experience unanticipated cardiac arrest  ASA# 12 - Carrie-op Mortality Rate: ASA12B - Patient did NOT die  ASA# 13 - PACU Re-Intubation Rate: ASA13B - Patient did NOT require a new airway mgmt  ASA# 10 - Composite Anes Safety: ASA10A - No serious adverse event    Additional Notes:

## 2021-06-24 NOTE — OP NOTE
COLONOSCOPY REPORT      Pre-op Dx:              Screening colonoscopy      Procedure:             Colonoscopy      Indications:            Colon Cancer Screening      Findings:                Normal colonoscopy    Procedure:              The patient is brought to the endoscopy suite placed in a lateral position and the patient is given conscious sedation anesthesia.  On external exam, there were several mild external hemorrhoids.  The quality of the prep was excellent.  The colonoscope was advanced atraumatically into the anus taken all way up and around to the cecum.  The ileocecal valve and the appendiceal orifice are clearly identified.  The scope was slowly drawn back no lesions seen in the cecum or the ascending colon no lesions seen in the transverse colon no lesions in the descending or sigmoid colon.  The scope was drawn back into the rectum and retroflexed I do not see evidence for any significant hemorrhoidal disease.  The colonoscope was straightened and taken up to the splenic flexure areas aspirated from the left side of the colon as the scope was withdrawn.    EBL:                        None      Medications:         MAC; see anesthesia note for details          Complications:      None      Post-op Dx:            Normal Colonoscopy      Recommendation:   Next Colonoscopy in 10 years      Mu Paiz  3/13/2019 1:15 PM  [facility]  [phone]

## 2021-06-24 NOTE — ANESTHESIA PREPROCEDURE EVALUATION
Anesthesia Evaluation      No history of anesthetic complications     Airway   Mallampati: III  Neck ROM: full   Pulmonary - negative ROS    breath sounds clear to auscultation                         Cardiovascular   Exercise tolerance: > or = 4 METS  (+) , hypercholesterolemia,     (-) hypertension  Rhythm: regular  Rate: normal,         Neuro/Psych - negative ROS     Endo/Other    (+) arthritis,   (-) no obesity     GI/Hepatic/Renal    (+)   bowel prep (denies nausea)    Comments: S/f screening colonoscopy           Dental - normal exam                        Anesthesia Plan  Planned anesthetic: MAC  MAC - propofol gtt only for rapid recovery.   Zofran 4 for antiemesis.   ASA 2     Anesthetic plan and risks discussed with: patient  Anesthesia plan special considerations: antiemetics,   Post-op plan: routine recovery

## 2021-06-24 NOTE — ANESTHESIA CARE TRANSFER NOTE
Last vitals:   Vitals:    03/13/19 1312   BP: 126/70   Pulse: 81   Resp: 16   Temp: 36.3  C (97.4  F)   SpO2: 95%     Patient's level of consciousness is drowsy  Spontaneous respirations: yes  Maintains airway independently: yes  Dentition unchanged: yes  Oropharynx: oropharynx clear of all foreign objects    QCDR Measures:  ASA# 20 - Surgical Safety Checklist: WHO surgical safety checklist completed prior to induction    PQRS# 430 - Adult PONV Prevention: NA - Not adult patient, not GA or 3 or more risk factors NOT present  ASA# 8 - Peds PONV Prevention: NA - Not pediatric patient, not GA or 2 or more risk factors NOT present  PQRS# 424 - Carrie-op Temp Management: 4559F - At least one body temp DOCUMENTED => 35.5C or 95.9F within required timeframe  PQRS# 426 - PACU Transfer Protocol: - Transfer of care checklist used  ASA# 14 - Acute Post-op Pain: ASA14B - Patient did NOT experience pain >= 7 out of 10

## 2021-06-24 NOTE — INTERVAL H&P NOTE
I have performed an assessment and examined the patient, as necessary, to update the patient's current status that may have changed since the prior History and Physical.  The History & Physical has been reviewed and the patient's status is unchanged.     Mu Paiz MD  [phone]  [facility] Department of Surgery

## 2021-06-27 ENCOUNTER — HEALTH MAINTENANCE LETTER (OUTPATIENT)
Age: 66
End: 2021-06-27

## 2021-06-27 NOTE — PROGRESS NOTES
Progress Notes by Misti Tidwell FNP at 2/19/2019 10:20 AM     Author: Misti Tidwell FNP Service: -- Author Type: Nurse Practitioner    Filed: 2/19/2019  4:00 PM Encounter Date: 2/19/2019 Status: Signed    : Misti Tidwell FNP (Nurse Practitioner)         Assessment and Plan:     1. Welcome to Medicare preventive visit  2. BMI 27.0-27.9,adult  - The following high BMI interventions were performed this visit: encouragement to exercise  - Electrocardiogram Perform and Read  - Basic Metabolic Panel  - JIC LAV  - Lipid Profile    3. Screen for colon cancer  - Ambulatory referral for Colonoscopy    4. Age-related osteoporosis without current pathological fracture  5. Post-menopausal  - DXA Bone Density Scan; Future  - Patient would need to consider a different medication aside from Fosamax if indicated due to history of GI side effects associated with this medication.    6. Mixed hyperlipidemia  - Basic Metabolic Panel  - Lipid Profile        The patient's current medical problems were reviewed.    The following health maintenance schedule was reviewed with the patient and provided in printed form in the after visit summary:   Health Maintenance   Topic Date Due   ? COLONOSCOPY  10/26/2014   ? ZOSTER VACCINES (2 of 3) 06/03/2015   ? PNEUMOCOCCAL CONJUGATE VACCINE FOR ADULTS (PCV13 OR PREVNAR)  01/10/2019   ? FALL RISK ASSESSMENT  01/10/2019   ? DXA SCAN  02/02/2019   ? INFLUENZA VACCINE RULE BASED (1) 02/19/2019 (Originally 8/1/2018)   ? PNEUMOCOCCAL POLYSACCHARIDE VACCINE AGE 65 AND OVER  02/19/2020 (Originally 1/10/2019)   ? MAMMOGRAM  02/02/2020   ? TD 18+ HE  12/29/2021   ? ADVANCE DIRECTIVES DISCUSSED WITH PATIENT  01/31/2023   ? TDAP ADULT ONE TIME DOSE  Completed        Subjective:   Chief Complaint: Charissa Lou is an 64 y.o. female here for a Welcome to Medicare visit.     HPI:  Charissa Lou is an 64 y.o. female here for a Welcome to Medicare visit. She is here with her spouse,  Tino, today. She does not have any new concerns.     Process was reviewed.  She is not presently taking any medication.  She was taking Fosamax in the past but this caused her to have diarrhea for the entire duration that she was taking this medication.  Her last bone density showed stability.     She has a history of hyperlipidemia but her 10-year ASCVD risk score is low at 3.2% so she treats this only with regular exercise and a healthy diet.     Review of Systems:  Please see above.  The rest of the review of systems are negative for all systems.    Patient Care Team:  Misti Tidwell FNP as PCP - General (Nurse Practitioner)     Patient Active Problem List   Diagnosis   ? Osteoporosis   ? Osteoarthritis   ? HLD (hyperlipidemia), 10-year ASCVD risk 3.2% 2/2017     No past medical history on file.   No past surgical history on file.   Family History   Problem Relation Age of Onset   ? Osteoporosis Mother    ? Diabetes Father    ? Breast cancer Neg Hx       Social History     Socioeconomic History   ? Marital status:      Spouse name: Not on file   ? Number of children: 1   ? Years of education: Not on file   ? Highest education level: Not on file   Social Needs   ? Financial resource strain: Not on file   ? Food insecurity - worry: Not on file   ? Food insecurity - inability: Not on file   ? Transportation needs - medical: Not on file   ? Transportation needs - non-medical: Not on file   Occupational History   ? Not on file   Tobacco Use   ? Smoking status: Never Smoker   ? Smokeless tobacco: Never Used   Substance and Sexual Activity   ? Alcohol use: Yes     Alcohol/week: 0.0 oz   ? Drug use: Not on file   ? Sexual activity: Not on file   Other Topics Concern   ? Not on file   Social History Narrative   ? Not on file       Current Outpatient Medications   Medication Sig Dispense Refill   ? CALCIUM CARBONATE/VITAMIN D3 (CALCIUM 500 + D ORAL) Take 1 tablet by mouth 2 times a day at 6:00 am and 4:00  "pm.     ? cholecalciferol, vitamin D3, 1,000 unit tablet Take 1,000 Units by mouth daily.     ? glucosamine-chondroitin 500-400 mg cap Take 2 capsules by mouth daily.     ? PSYLLIUM SEED, WITH SUGAR, (METAMUCIL ORAL) Take by mouth.       No current facility-administered medications for this visit.       Objective:   Vital Signs:   Visit Vitals  /74   Pulse 66   Ht 5' 3.5\" (1.613 m)   Wt 157 lb (71.2 kg)   BMI 27.38 kg/m         EKG:      VisionScreening:   Visual Acuity Screening    Right eye Left eye Both eyes   Without correction: 10/12.5 10/20 10/12.5   With correction:           PHYSICAL EXAM  Physical Exam   Constitutional: She is oriented to person, place, and time and well-developed, well-nourished, and in no distress.   HENT:   Head: Normocephalic and atraumatic.   Right Ear: External ear normal.   Left Ear: External ear normal.   Eyes: Pupils are equal, round, and reactive to light. Right eye exhibits no discharge. Left eye exhibits no discharge. No scleral icterus.   Neck: Normal range of motion. Neck supple. No thyromegaly present.   Cardiovascular: Normal rate, regular rhythm, normal heart sounds and intact distal pulses. Exam reveals no gallop and no friction rub.   No murmur heard.  Pulmonary/Chest: Effort normal and breath sounds normal.   Abdominal: Soft. Bowel sounds are normal.   Lymphadenopathy:     She has no cervical adenopathy.   Neurological: She is alert and oriented to person, place, and time.   Psychiatric: Mood, memory, affect and judgment normal.           Assessment Results 2/19/2019   Activities of Daily Living No help needed   Instrumental Activities of Daily Living No help needed   Mini Cog Total Score 3   Some recent data might be hidden     A Mini Cog score of 0-2 suggests the possibility of dementia, score of 3-5 suggests no dementia    Identified Health Risks:     Information regarding advance directives (living [place]), including where she can download the appropriate form, " was provided to the patient via the AVS.

## 2021-07-03 NOTE — H&P (VIEW-ONLY)
H&P (View-Only) by Misti Tidwell FNP at 2/19/2019 10:20 AM     Author: Misti Tidwell FNP Service: -- Author Type: Nurse Practitioner    Filed: 2/19/2019  4:00 PM Date of Service: 2/19/2019 10:20 AM Status: Signed    : Misti Tidwell FNP (Nurse Practitioner)         Assessment and Plan:     1. Welcome to Medicare preventive visit  2. BMI 27.0-27.9,adult  - The following high BMI interventions were performed this visit: encouragement to exercise  - Electrocardiogram Perform and Read  - Basic Metabolic Panel  - JIC LAV  - Lipid Profile    3. Screen for colon cancer  - Ambulatory referral for Colonoscopy    4. Age-related osteoporosis without current pathological fracture  5. Post-menopausal  - DXA Bone Density Scan; Future  - Patient would need to consider a different medication aside from Fosamax if indicated due to history of GI side effects associated with this medication.    6. Mixed hyperlipidemia  - Basic Metabolic Panel  - Lipid Profile        The patient's current medical problems were reviewed.    The following health maintenance schedule was reviewed with the patient and provided in printed form in the after visit summary:   Health Maintenance   Topic Date Due   ? COLONOSCOPY  10/26/2014   ? ZOSTER VACCINES (2 of 3) 06/03/2015   ? PNEUMOCOCCAL CONJUGATE VACCINE FOR ADULTS (PCV13 OR PREVNAR)  01/10/2019   ? FALL RISK ASSESSMENT  01/10/2019   ? DXA SCAN  02/02/2019   ? INFLUENZA VACCINE RULE BASED (1) 02/19/2019 (Originally 8/1/2018)   ? PNEUMOCOCCAL POLYSACCHARIDE VACCINE AGE 65 AND OVER  02/19/2020 (Originally 1/10/2019)   ? MAMMOGRAM  02/02/2020   ? TD 18+ HE  12/29/2021   ? ADVANCE DIRECTIVES DISCUSSED WITH PATIENT  01/31/2023   ? TDAP ADULT ONE TIME DOSE  Completed        Subjective:   Chief Complaint: Charissa Lou is an 64 y.o. female here for a Welcome to Medicare visit.     HPI:  Charissa Lou is an 64 y.o. female here for a Welcome to Medicare visit. She is here with  her spouse, Tino, today. She does not have any new concerns.     Process was reviewed.  She is not presently taking any medication.  She was taking Fosamax in the past but this caused her to have diarrhea for the entire duration that she was taking this medication.  Her last bone density showed stability.     She has a history of hyperlipidemia but her 10-year ASCVD risk score is low at 3.2% so she treats this only with regular exercise and a healthy diet.     Review of Systems:  Please see above.  The rest of the review of systems are negative for all systems.    Patient Care Team:  Misti Tidwell FNP as PCP - General (Nurse Practitioner)     Patient Active Problem List   Diagnosis   ? Osteoporosis   ? Osteoarthritis   ? HLD (hyperlipidemia), 10-year ASCVD risk 3.2% 2/2017     No past medical history on file.   No past surgical history on file.   Family History   Problem Relation Age of Onset   ? Osteoporosis Mother    ? Diabetes Father    ? Breast cancer Neg Hx       Social History     Socioeconomic History   ? Marital status:      Spouse name: Not on file   ? Number of children: 1   ? Years of education: Not on file   ? Highest education level: Not on file   Social Needs   ? Financial resource strain: Not on file   ? Food insecurity - worry: Not on file   ? Food insecurity - inability: Not on file   ? Transportation needs - medical: Not on file   ? Transportation needs - non-medical: Not on file   Occupational History   ? Not on file   Tobacco Use   ? Smoking status: Never Smoker   ? Smokeless tobacco: Never Used   Substance and Sexual Activity   ? Alcohol use: Yes     Alcohol/week: 0.0 oz   ? Drug use: Not on file   ? Sexual activity: Not on file   Other Topics Concern   ? Not on file   Social History Narrative   ? Not on file       Current Outpatient Medications   Medication Sig Dispense Refill   ? CALCIUM CARBONATE/VITAMIN D3 (CALCIUM 500 + D ORAL) Take 1 tablet by mouth 2 times a day at 6:00  "am and 4:00 pm.     ? cholecalciferol, vitamin D3, 1,000 unit tablet Take 1,000 Units by mouth daily.     ? glucosamine-chondroitin 500-400 mg cap Take 2 capsules by mouth daily.     ? PSYLLIUM SEED, WITH SUGAR, (METAMUCIL ORAL) Take by mouth.       No current facility-administered medications for this visit.       Objective:   Vital Signs:   Visit Vitals  /74   Pulse 66   Ht 5' 3.5\" (1.613 m)   Wt 157 lb (71.2 kg)   BMI 27.38 kg/m         EKG:      VisionScreening:   Visual Acuity Screening    Right eye Left eye Both eyes   Without correction: 10/12.5 10/20 10/12.5   With correction:           PHYSICAL EXAM  Physical Exam   Constitutional: She is oriented to person, place, and time and well-developed, well-nourished, and in no distress.   HENT:   Head: Normocephalic and atraumatic.   Right Ear: External ear normal.   Left Ear: External ear normal.   Eyes: Pupils are equal, round, and reactive to light. Right eye exhibits no discharge. Left eye exhibits no discharge. No scleral icterus.   Neck: Normal range of motion. Neck supple. No thyromegaly present.   Cardiovascular: Normal rate, regular rhythm, normal heart sounds and intact distal pulses. Exam reveals no gallop and no friction rub.   No murmur heard.  Pulmonary/Chest: Effort normal and breath sounds normal.   Abdominal: Soft. Bowel sounds are normal.   Lymphadenopathy:     She has no cervical adenopathy.   Neurological: She is alert and oriented to person, place, and time.   Psychiatric: Mood, memory, affect and judgment normal.           Assessment Results 2/19/2019   Activities of Daily Living No help needed   Instrumental Activities of Daily Living No help needed   Mini Cog Total Score 3   Some recent data might be hidden     A Mini Cog score of 0-2 suggests the possibility of dementia, score of 3-5 suggests no dementia    Identified Health Risks:     Information regarding advance directives (living [place]), including where she can download the " appropriate form, was provided to the patient via the AVS.

## 2021-07-13 ENCOUNTER — RECORDS - HEALTHEAST (OUTPATIENT)
Dept: ADMINISTRATIVE | Facility: CLINIC | Age: 66
End: 2021-07-13

## 2021-07-21 ENCOUNTER — RECORDS - HEALTHEAST (OUTPATIENT)
Dept: ADMINISTRATIVE | Facility: CLINIC | Age: 66
End: 2021-07-21

## 2021-10-17 ENCOUNTER — HEALTH MAINTENANCE LETTER (OUTPATIENT)
Age: 66
End: 2021-10-17

## 2022-03-17 ASSESSMENT — ACTIVITIES OF DAILY LIVING (ADL): CURRENT_FUNCTION: NO ASSISTANCE NEEDED

## 2022-03-23 ENCOUNTER — ANCILLARY PROCEDURE (OUTPATIENT)
Dept: MAMMOGRAPHY | Facility: CLINIC | Age: 67
End: 2022-03-23
Attending: NURSE PRACTITIONER
Payer: COMMERCIAL

## 2022-03-23 ENCOUNTER — OFFICE VISIT (OUTPATIENT)
Dept: INTERNAL MEDICINE | Facility: CLINIC | Age: 67
End: 2022-03-23
Payer: COMMERCIAL

## 2022-03-23 VITALS
BODY MASS INDEX: 27.55 KG/M2 | HEIGHT: 63 IN | OXYGEN SATURATION: 99 % | WEIGHT: 155.5 LBS | HEART RATE: 91 BPM | DIASTOLIC BLOOD PRESSURE: 68 MMHG | SYSTOLIC BLOOD PRESSURE: 132 MMHG

## 2022-03-23 DIAGNOSIS — M81.0 AGE-RELATED OSTEOPOROSIS WITHOUT CURRENT PATHOLOGICAL FRACTURE: ICD-10-CM

## 2022-03-23 DIAGNOSIS — Z12.31 VISIT FOR SCREENING MAMMOGRAM: ICD-10-CM

## 2022-03-23 DIAGNOSIS — R25.1 TREMOR: ICD-10-CM

## 2022-03-23 DIAGNOSIS — Z00.00 ENCOUNTER FOR MEDICARE ANNUAL WELLNESS EXAM: ICD-10-CM

## 2022-03-23 DIAGNOSIS — E55.9 VITAMIN D DEFICIENCY: Primary | ICD-10-CM

## 2022-03-23 LAB — DEPRECATED CALCIDIOL+CALCIFEROL SERPL-MC: 67 UG/L (ref 20–75)

## 2022-03-23 PROCEDURE — 82306 VITAMIN D 25 HYDROXY: CPT | Performed by: NURSE PRACTITIONER

## 2022-03-23 PROCEDURE — 77067 SCR MAMMO BI INCL CAD: CPT

## 2022-03-23 PROCEDURE — 36415 COLL VENOUS BLD VENIPUNCTURE: CPT | Performed by: NURSE PRACTITIONER

## 2022-03-23 PROCEDURE — 90471 IMMUNIZATION ADMIN: CPT | Performed by: NURSE PRACTITIONER

## 2022-03-23 PROCEDURE — 90714 TD VACC NO PRESV 7 YRS+ IM: CPT | Performed by: NURSE PRACTITIONER

## 2022-03-23 PROCEDURE — 99213 OFFICE O/P EST LOW 20 MIN: CPT | Mod: 25 | Performed by: NURSE PRACTITIONER

## 2022-03-23 PROCEDURE — 99397 PER PM REEVAL EST PAT 65+ YR: CPT | Mod: 25 | Performed by: NURSE PRACTITIONER

## 2022-03-23 ASSESSMENT — ACTIVITIES OF DAILY LIVING (ADL): CURRENT_FUNCTION: NO ASSISTANCE NEEDED

## 2022-03-23 NOTE — PROGRESS NOTES
"SUBJECTIVE:   Charissa Lou is a 67 year old female who presents for Preventive Visit.  She is accompanied today by her daughter, Valerie, and her , Tino.    She does not have any new concerns today.    Patient has been advised of split billing requirements and indicates understanding: Yes  Are you in the first 12 months of your Medicare coverage?  No    Healthy Habits:     In general, how would you rate your overall health?  Good    Frequency of exercise:  4-5 days/week    Duration of exercise:  15-30 minutes    Do you usually eat at least 4 servings of fruit and vegetables a day, include whole grains    & fiber and avoid regularly eating high fat or \"junk\" foods?  Yes    Taking medications regularly:  Yes    Ability to successfully perform activities of daily living:  No assistance needed    Home Safety:  No safety concerns identified    Hearing Impairment:  No hearing concerns    In the past 6 months, have you been bothered by leaking of urine?  No    In general, how would you rate your overall mental or emotional health?  Excellent      PHQ-2 Total Score: 0    Additional concerns today:  No    Do you feel safe in your environment? Yes    Have you ever done Advance Care Planning? (For example, a Health Directive, POLST, or a discussion with a medical provider or your loved ones about your wishes): No, advance care planning information given to patient to review.  Patient plans to discuss their wishes with loved ones or provider.      Fall risk  Fallen 2 or more times in the past year?: No  Any fall with injury in the past year?: No    Cognitive Screening   1) Repeat 3 items (Leader, Season, Table)    2) Clock draw: forms provided to patient   3) 3 item recall: Recalls 1 object   Results: NORMAL clock, 1-2 items recalled: COGNITIVE IMPAIRMENT LESS LIKELY    Mini-CogTM Copyright S Uri. Licensed by the author for use in [facility]; reprinted with permission (cassie@.[facility]). All rights " "reserved.      Do you have sleep apnea, excessive snoring or daytime drowsiness?: no    Reviewed and updated as needed this visit by clinical staff   Tobacco  Allergies  Meds  Problems  Med Hx  Surg Hx  Fam Hx          Reviewed and updated as needed this visit by Provider   Tobacco  Allergies  Meds  Problems  Med Hx  Surg Hx  Fam Hx         Social History     Tobacco Use     Smoking status: Never Smoker     Smokeless tobacco: Never Used   Substance Use Topics     Alcohol use: Yes     Alcohol/week: 0.0 standard drinks     If you drink alcohol do you typically have >3 drinks per day or >7 drinks per week? No    Alcohol Use 3/23/2022   Prescreen: >3 drinks/day or >7 drinks/week? -   Prescreen: >3 drinks/day or >7 drinks/week? No             Current providers sharing in care for this patient include:   Patient Care Team:  Misti Tidwell NP as PCP - General (Orthothist)  Misti Tidwell NP as Assigned PCP    The following health maintenance items are reviewed in Epic and correct as of today:  Health Maintenance Due   Topic Date Due     ANNUAL REVIEW OF HM ORDERS  Never done     COLORECTAL CANCER SCREENING  10/26/2014           Breast CA Risk Assessment (FHS-7) 3/17/2022 3/23/2022   Do you have a family history of breast, colon, or ovarian cancer? No / Unknown No / Unknown         OBJECTIVE:   /68 (BP Location: Right arm, Patient Position: Sitting)   Pulse 91   Ht 1.6 m (5' 3\")   Wt 70.5 kg (155 lb 8 oz)   SpO2 99%   BMI 27.55 kg/m   Estimated body mass index is 27.55 kg/m  as calculated from the following:    Height as of this encounter: 1.6 m (5' 3\").    Weight as of this encounter: 70.5 kg (155 lb 8 oz).  Physical Exam  Gen: Well developed, well nourished, no acute distress.  HEENT: EOMI, TMs: Gray, normal light reflex.  Oral mucosa: no erythema/exudate  Neck: No LAD/masses/thyromegaly/bruits  Lungs: clear bilaterally  Heart: regular rate and rhythm, no murmurs/gallops/rubs  Abdomen: Normal bowel " "sounds, soft, non-tender, non-distended  Lymphatics: No edema.  Neuro: A&O x 3  Psych: Behavior appropriate, engaging.  Thought processes congruent, non-tangential.  Musculoskeletal: no gross deformities.        ASSESSMENT / PLAN:     Problem List Items Addressed This Visit        Musculoskeletal and Integumentary    Osteoporosis     Continue weightbearing exercise, vitamin D supplementation  Consider DEXA repeat next year            Other    Tremor     Mild action tremor.  Does not bother patient, she declines any treatment.           Other Visit Diagnoses     Vitamin D deficiency    -  Primary    Relevant Orders    Vitamin D Deficiency (Completed)    Encounter for Medicare annual wellness exam                COUNSELING:  Reviewed preventive health counseling, as reflected in patient instructions    Estimated body mass index is 27.55 kg/m  as calculated from the following:    Height as of this encounter: 1.6 m (5' 3\").    Weight as of this encounter: 70.5 kg (155 lb 8 oz).    Weight management plan: Discussed healthy diet and exercise guidelines    She reports that she has never smoked. She has never used smokeless tobacco.      Appropriate preventive services were discussed with this patient, including applicable screening as appropriate for cardiovascular disease, diabetes, osteopenia/osteoporosis, and glaucoma.  As appropriate for age/gender, discussed screening for colorectal cancer, prostate cancer, breast cancer, and cervical cancer. Checklist reviewing preventive services available has been given to the patient.    Reviewed patients plan of care and provided an AVS. The Basic Care Plan (routine screening as documented in Health Maintenance) for Charissa meets the Care Plan requirement. This Care Plan has been established and reviewed with the Patient.    Counseling Resources:  ATP IV Guidelines  Pooled Cohorts Equation Calculator  Breast Cancer Risk Calculator  Breast Cancer: Medication to Reduce Risk  FRAX " Risk Assessment  ICSI Preventive Guidelines  Dietary Guidelines for Americans, 2010  USDA's MyPlate  ASA Prophylaxis  Lung CA Screening    Misti Tidwell NP  [facility]    Identified Health Risks:

## 2022-03-25 NOTE — PATIENT INSTRUCTIONS
Patient Education   Personalized Prevention Plan  You are due for the preventive services outlined below.  Your care team is available to assist you in scheduling these services.  If you have already completed any of these items, please share that information with your care team to update in your medical record.  Health Maintenance Due   Topic Date Due     ANNUAL REVIEW OF  ORDERS  Never done     Colorectal Cancer Screening  10/26/2014

## 2022-10-02 ENCOUNTER — HEALTH MAINTENANCE LETTER (OUTPATIENT)
Age: 67
End: 2022-10-02

## 2023-01-09 ENCOUNTER — TELEPHONE (OUTPATIENT)
Dept: INTERNAL MEDICINE | Facility: CLINIC | Age: 68
End: 2023-01-09

## 2023-01-09 DIAGNOSIS — Z78.0 POST-MENOPAUSAL: Primary | ICD-10-CM

## 2023-01-09 NOTE — TELEPHONE ENCOUNTER
Order/Referral Request    Who is requesting: Spouse, Tino    Orders being requested: DEXA    Reason service is needed/diagnosis: Every other year patient has bone scan per .    When are orders needed by: at PCP convenience, would like to schedule for 03/09/23 which is same day as AWV    Has this been discussed with Provider: Yes, previously    Does patient have a preference on a Group/Provider/Facility? [facility]    Does patient have an appointment scheduled?: Yes: 03/09/2023    Where to send orders: EPIC

## 2023-05-20 ENCOUNTER — HEALTH MAINTENANCE LETTER (OUTPATIENT)
Age: 68
End: 2023-05-20

## 2024-07-27 ENCOUNTER — HEALTH MAINTENANCE LETTER (OUTPATIENT)
Age: 69
End: 2024-07-27